# Patient Record
Sex: MALE | Race: WHITE | NOT HISPANIC OR LATINO | URBAN - NONMETROPOLITAN AREA
[De-identification: names, ages, dates, MRNs, and addresses within clinical notes are randomized per-mention and may not be internally consistent; named-entity substitution may affect disease eponyms.]

---

## 2018-06-29 PROBLEM — H43.812: Noted: 2019-12-16

## 2018-06-29 PROBLEM — E11.9: Noted: 2018-06-29

## 2018-06-29 PROBLEM — H43.812: Noted: 2018-06-29

## 2018-06-29 PROBLEM — H04.123: Noted: 2018-06-29

## 2018-06-29 PROBLEM — Z96.1: Noted: 2018-06-29

## 2018-06-29 PROBLEM — H40.013: Noted: 2018-06-29

## 2018-06-29 PROBLEM — H35.3132: Noted: 2018-06-29

## 2018-06-29 PROBLEM — H40.1131: Noted: 2019-12-16

## 2019-05-08 ENCOUNTER — IMPORTED ENCOUNTER (OUTPATIENT)
Dept: URBAN - NONMETROPOLITAN AREA CLINIC 1 | Facility: CLINIC | Age: 79
End: 2019-05-08

## 2019-05-08 PROCEDURE — 92014 COMPRE OPH EXAM EST PT 1/>: CPT

## 2019-05-08 NOTE — PATIENT DISCUSSION
AMD - dry (+)Progression OS >OD-Explained dry AMD and advised that there are no treatments available at this time.-Continue AREDS 2 MVT. -Continue Amsler grid monitoring daily. Pt is to contact our office if any changes are noted. DM s -Stressed the importance of keeping blood sugars under control and regular visits with PCP. -Explained the possible effects of poorly controlled diabetes and the damage that diabetes can cause to ocular health. -Pt instructed to contact our office with any vision changes. Glaucoma Suspect-Based on HIGH IOP on past visitsstable todayPVD os new-Retina flat 360 with no breaks tears or heme.-S&S of RD/RT reviewed with pt.-Stressed that pt should contact our office right away with any changes or increase in symptoms.; Dr's Notes: Use ARMD Dx for PHP at APRIL 2015 visitUse POAG Dx for MAC and [facility] OCT in March 2015 due to elevated IOP at 9/29/2014 visitPatient goes to DM clinic beside health dept

## 2019-12-16 ENCOUNTER — IMPORTED ENCOUNTER (OUTPATIENT)
Dept: URBAN - NONMETROPOLITAN AREA CLINIC 1 | Facility: CLINIC | Age: 79
End: 2019-12-16

## 2019-12-16 PROCEDURE — 92014 COMPRE OPH EXAM EST PT 1/>: CPT

## 2019-12-16 NOTE — PATIENT DISCUSSION
AMD - dry (+)Progression OS >OD-Explained dry AMD and advised that there are no treatments available at this time.-Continue AREDS 2 MVT. -Continue Amsler grid monitoring daily. Pt is to contact our office if any changes are noted. DM s -Stressed the importance of keeping blood sugars under control and regular visits with PCP. -Explained the possible effects of poorly controlled diabetes and the damage that diabetes can cause to ocular health. -Pt instructed to contact our office with any vision changes. PVD os new-Retina flat 360 with no breaks tears or heme.-S&S of RD/RT reviewed with pt.-Stressed that pt should contact our office right away with any changes or increase in symptoms. COAG-Possibly worse. start xalatan qhs ouf/u 3 mon ta check and vfDES-Explained EILEEN and associated symptoms.-Recommend increasing Omega 3s.-Pt to begin artificial tears OU QID PRN. Pt will contact us if this does not provide relief. Consider punctal plugs in that case. cont restasis bid oupseudophakia oumonitor; 's Notes: Use ARMD Dx for PHP at APRIL 2015 visitUse POAG Dx for MAC and [facility] [place] OCT in March 2015 due to elevated IOP at 9/29/2014 visitPatient goes to DM clinic beside health dept

## 2022-04-09 ASSESSMENT — TONOMETRY
OD_IOP_MMHG: 22
OD_IOP_MMHG: 17
OS_IOP_MMHG: 17
OS_IOP_MMHG: 24

## 2022-04-09 ASSESSMENT — VISUAL ACUITY
OD_CC: J2
OS_SC: 20/30
OD_SC: 20/30
OS_CC: J2